# Patient Record
Sex: MALE | Race: WHITE | NOT HISPANIC OR LATINO | URBAN - METROPOLITAN AREA
[De-identification: names, ages, dates, MRNs, and addresses within clinical notes are randomized per-mention and may not be internally consistent; named-entity substitution may affect disease eponyms.]

---

## 2017-05-05 ENCOUNTER — EMERGENCY (EMERGENCY)
Facility: HOSPITAL | Age: 37
LOS: 1 days | Discharge: HOME | End: 2017-05-05
Attending: EMERGENCY MEDICINE | Admitting: EMERGENCY MEDICINE
Payer: COMMERCIAL

## 2017-05-05 VITALS
WEIGHT: 179.9 LBS | HEART RATE: 100 BPM | OXYGEN SATURATION: 100 % | HEIGHT: 64 IN | SYSTOLIC BLOOD PRESSURE: 136 MMHG | DIASTOLIC BLOOD PRESSURE: 77 MMHG | TEMPERATURE: 99 F | RESPIRATION RATE: 16 BRPM

## 2017-05-05 PROCEDURE — 99283 EMERGENCY DEPT VISIT LOW MDM: CPT

## 2017-05-05 PROCEDURE — 99283 EMERGENCY DEPT VISIT LOW MDM: CPT | Mod: 25

## 2017-05-05 RX ORDER — CLONAZEPAM 1 MG
0 TABLET ORAL
Qty: 0 | Refills: 0 | COMMUNITY

## 2017-05-05 RX ORDER — FLUOXETINE HCL 10 MG
0 CAPSULE ORAL
Qty: 0 | Refills: 0 | COMMUNITY

## 2017-05-05 RX ORDER — FENOFIBRIC ACID 105 MG/1
0 TABLET ORAL
Qty: 0 | Refills: 0 | COMMUNITY

## 2017-05-05 RX ORDER — QUETIAPINE FUMARATE 200 MG/1
1 TABLET, FILM COATED ORAL
Qty: 0 | Refills: 0 | COMMUNITY

## 2017-05-05 RX ORDER — ESOMEPRAZOLE MAGNESIUM 40 MG/1
0 CAPSULE, DELAYED RELEASE ORAL
Qty: 0 | Refills: 0 | COMMUNITY

## 2017-05-05 NOTE — ED PROCEDURE NOTE - NS ED PERI NEURO POS
Post-application: Motor, sensory, and vascular responses NOT intact in the injured extremity./Pre-application: Motor, sensory, and vascular responses NOT intact in the injured extremity.

## 2017-05-05 NOTE — ED PROVIDER NOTE - PMH
<<----- Click to add NO pertinent Past Medical History No pertinent past medical history Anxiety    Cerebral palsy

## 2017-05-05 NOTE — ED PROVIDER NOTE - NS ED MD SCRIBE ATTENDING SCRIBE SECTIONS
HISTORY OF PRESENT ILLNESS/DISPOSITION/VITAL SIGNS( Pullset)/HIV/REVIEW OF SYSTEMS/PHYSICAL EXAM/PAST MEDICAL/SURGICAL/SOCIAL HISTORY

## 2017-05-05 NOTE — ED PROVIDER NOTE - OBJECTIVE STATEMENT
Pt is a 38 y/o M w/pmhx of cerebral palsy, GERD, anxiety presents to the ED with limited ROM in the right wrist. Pt cannot bend the wrist upward or open his arm completely. States he blacked out from drinking last night at his kitchen table. Denies any smoking. Pt is an alcoholic and in out patient rehab program. Pt is a 36 y/o M w/pmhx of cerebral palsy, GERD, anxiety presents to the ED with limited ROM in the right wrist. Pt cannot bend the wrist upward or open his arm completely. States he blacked out from drinking last night at his kitchen table. Denies any smoking. Pt is an alcoholic and in out patient rehab program.  No other complaint

## 2017-05-05 NOTE — ED PROVIDER NOTE - NEUROLOGICAL, MLM
Alert and oriented, no focal deficits. Pt is unable to extend the right wrist. Sensation intact throughout.

## 2017-05-05 NOTE — ED PROVIDER NOTE - MEDICAL DECISION MAKING DETAILS
38 y/o M presents with inability to extend right wrist. Pt is an alcoholic and states he blacked out at his kitchen table and since that time could not extend the right wrist. Will give neuro follow up and place right wrist in splint for comfort as requested by patient.

## 2017-05-05 NOTE — ED ADULT NURSE NOTE - CHPI ED SYMPTOMS NEG
no decreased eating/drinking/no numbness/no vomiting/no fever/no chills/no tingling/no dizziness/no pain

## 2018-01-16 ENCOUNTER — RX RENEWAL (OUTPATIENT)
Age: 38
End: 2018-01-16

## 2018-01-16 DIAGNOSIS — K21.0 GASTRO-ESOPHAGEAL REFLUX DISEASE WITH ESOPHAGITIS: ICD-10-CM

## 2018-01-16 RX ORDER — OMEPRAZOLE 40 MG/1
40 CAPSULE, DELAYED RELEASE ORAL TWICE DAILY
Qty: 60 | Refills: 5 | Status: ACTIVE | COMMUNITY
Start: 2018-01-16 | End: 1900-01-01

## 2018-06-27 ENCOUNTER — APPOINTMENT (OUTPATIENT)
Dept: GASTROENTEROLOGY | Facility: CLINIC | Age: 38
End: 2018-06-27

## 2018-07-02 ENCOUNTER — RX RENEWAL (OUTPATIENT)
Age: 38
End: 2018-07-02

## 2018-07-02 RX ORDER — ESOMEPRAZOLE MAGNESIUM 40 MG/1
40 CAPSULE, DELAYED RELEASE ORAL TWICE DAILY
Qty: 60 | Refills: 1 | Status: ACTIVE | COMMUNITY
Start: 2018-01-16 | End: 1900-01-01

## 2023-05-05 NOTE — ED ADULT TRIAGE NOTE - LOCATION:
Impression: Nonexudative macular degeneration, early dry stage, bilateral: H35.3131. Plan: Macular degeneration, dry type with stable vision. Will continue to monitor vision and the patient has been instructed to call with any vision changes. Recommend the supplements AREDS II and a green, leafy diet. Highly recommended not to begin smoking. Mac OCT performed at today's visit, testing stable with no changes noted. Left arm;

## 2025-01-05 NOTE — ED PROVIDER NOTE - DETAILS:
No
Raudel Rascon MD - The scribe's documentation has been prepared under my direction and personally reviewed by me in its entirety. I confirm that the note above accurately reflects all work, treatment, procedures, and medical decision making performed by me.
